# Patient Record
(demographics unavailable — no encounter records)

---

## 2025-03-08 NOTE — HISTORY OF PRESENT ILLNESS
[FreeTextEntry1] : 31-year-old female -0-1-1 presenting office for follow-up after a visit to the ER for vaginal bleeding on 3/5/2025.  Her bleeding has significantly decreased since that visit.  Her ultrasound at that time significant for her uterus measuring 10.0 x 4.8 x 6.0 with no IUP visualized and a 10 mm endometrial lining.  There is a right ovarian cyst noted likely corpus luteum, measuring 1.6 x 1.2 x 1.9.  Her hCG was 1788 and her blood type is A+.  This was a desired pregnancy and she is understandably upset.  Prior obstetric history of vacuum-assisted vaginal delivery at 39 weeks and 4 days gestation 5/15/2024; complicated by laboratory blood work concerning for possible HELLP syndrome.

## 2025-03-08 NOTE — PLAN
[FreeTextEntry1] : Subjective history, laboratory blood work, and imaging consistent with a diagnosis of pregnancy of unknown location versus spontaneous .  Patient will present to the laboratory for stat hCG and progesterone serum blood work.  She will be contacted regarding the results of her blood work and is recommended she repeat this weekly until her hCG is less than 5.  She was given strict call, follow-up, return to ER precautions and all questions were addressed.

## 2025-03-08 NOTE — COUNSELING
[Nutrition/ Exercise/ Weight Management] : nutrition, exercise, weight management [Vitamins/Supplements] : vitamins/supplements [Preconception Care/ Fertility options] : preconception care, fertility options [Pregnancy Options] : pregnancy options [Confidentiality] : confidentiality [Lab Results] : lab results [Medication Management] : medication management [Other ___] : [unfilled]

## 2025-04-23 NOTE — ASSESSMENT
[FreeTextEntry1] : Annual physical: f/u routine labwork First trimester pregnancy: LMP 3/16/25, c/w PNV, f/u OB Fam hx breast CA: f/u GYN for genetic counseling RTC 3 wks [Vaccines Reviewed] : Immunizations reviewed today. Please see immunization details in the vaccine log within the immunization flowsheet.

## 2025-04-23 NOTE — HISTORY OF PRESENT ILLNESS
[FreeTextEntry1] : np cpe [de-identified] : 32 yo female presents for annual physical. Reports feeling well. Denies fever, chills, cp, palpitations, sob, nvcd. She is pregnant. LMP 3/16/25.

## 2025-04-23 NOTE — HEALTH RISK ASSESSMENT
[Very Good] : ~his/her~  mood as very good [1 or 2 (0 pts)] : 1 or 2 (0 points) [Never (0 pts)] : Never (0 points) [No] : In the past 12 months have you used drugs other than those required for medical reasons? No [0] : 2) Feeling down, depressed, or hopeless: Not at all (0) [PHQ-2 Negative - No further assessment needed] : PHQ-2 Negative - No further assessment needed [Audit-CScore] : 0 [de-identified] : needs improvement [de-identified] : healthy [[id number]] : 0 [Patient reported PAP Smear was normal] : Patient reported PAP Smear was normal [HIV test declined] : HIV test declined [Hepatitis C test declined] : Hepatitis C test declined [With Family] : lives with family [Employed] : employed [] :  [Sexually Active] : sexually active [High Risk Behavior] : no high risk behavior [Feels Safe at Home] : Feels safe at home [Fully functional (bathing, dressing, toileting, transferring, walking, feeding)] : Fully functional (bathing, dressing, toileting, transferring, walking, feeding) [Fully functional (using the telephone, shopping, preparing meals, housekeeping, doing laundry, using] : Fully functional and needs no help or supervision to perform IADLs (using the telephone, shopping, preparing meals, housekeeping, doing laundry, using transportation, managing medications and managing finances) [Reports changes in hearing] : Reports no changes in hearing [Reports changes in vision] : Reports no changes in vision [Reports changes in dental health] : Reports no changes in dental health [PapSmearDate] : 09/23 [Never] : Never

## 2025-05-14 NOTE — PLAN
[FreeTextEntry1] : 31-year-old female -0-2-1 at 9 and 2/7 weeks by today's sono.  Positive cardiac activity was visualized.  EDC by today's sono was 2025.  Patient states nausea is resolving but vomiting is not present.  She is vomiting 1-2 times per day.  At her last visit, she was given a prescription for diclegis.  She has not used this medication.  She is not interested in other medications at this time.  We discussed dietary precautions and over-the-counter supplements that can help with nausea and vomiting.  Call parameters were reviewed.  She will return to the office in 2 weeks for her first prenatal visit.  The patient was given the opportunity to ask questions and all were answered to her satisfaction.

## 2025-05-14 NOTE — HISTORY OF PRESENT ILLNESS
[FreeTextEntry1] : 31-year-old female -0-2-1 with unknown LMP presents for dating sono.  Patient was seen in the office May 1 for confirmation of pregnancy.  Transvaginal sonogram was performed showing an intrauterine pregnancy at 7+ weeks.  EDC by sonogram was 2025.  Patient here today for official dating sonogram with the tech.  She denies any vaginal bleeding or cramping.  Patient states that the nausea is resolving however she is not vomiting.  States vomiting happens 1-2 times per day.  She is not interested in medications at this time.  She is taking prenatal vitamins.    She has no significant past medical or surgical history.  Social history is negative x 3.  GYN history with menses every 28 days.  She denies any significant GYN history.  OB history: -0-2-1.  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  She also has a history of 2 miscarriages.  No known drug allergies.  She is taking prenatal vitamins.